# Patient Record
Sex: FEMALE | Race: WHITE | Employment: UNEMPLOYED | URBAN - METROPOLITAN AREA
[De-identification: names, ages, dates, MRNs, and addresses within clinical notes are randomized per-mention and may not be internally consistent; named-entity substitution may affect disease eponyms.]

---

## 2017-02-22 RX ORDER — LEVOCETIRIZINE DIHYDROCHLORIDE 5 MG/1
TABLET, FILM COATED ORAL
Qty: 30 TABLET | Refills: 0 | Status: SHIPPED | OUTPATIENT
Start: 2017-02-22 | End: 2017-02-22

## 2017-02-22 RX ORDER — LEVOCETIRIZINE DIHYDROCHLORIDE 5 MG/1
TABLET, FILM COATED ORAL
Qty: 45 TABLET | Refills: 0 | Status: SHIPPED | OUTPATIENT
Start: 2017-02-22 | End: 2017-07-03

## 2017-03-17 ENCOUNTER — OFFICE VISIT (OUTPATIENT)
Dept: INTERNAL MEDICINE CLINIC | Facility: CLINIC | Age: 57
End: 2017-03-17

## 2017-03-17 VITALS
BODY MASS INDEX: 21.67 KG/M2 | HEART RATE: 114 BPM | RESPIRATION RATE: 17 BRPM | DIASTOLIC BLOOD PRESSURE: 68 MMHG | OXYGEN SATURATION: 98 % | TEMPERATURE: 99 F | SYSTOLIC BLOOD PRESSURE: 124 MMHG | HEIGHT: 60.25 IN | WEIGHT: 111.81 LBS

## 2017-03-17 DIAGNOSIS — K58.1 IRRITABLE BOWEL SYNDROME WITH CONSTIPATION: Primary | ICD-10-CM

## 2017-03-17 PROCEDURE — 99213 OFFICE O/P EST LOW 20 MIN: CPT | Performed by: INTERNAL MEDICINE

## 2017-03-17 RX ORDER — OMEPRAZOLE 20 MG/1
20 CAPSULE, DELAYED RELEASE ORAL
Qty: 30 CAPSULE | Refills: 6 | Status: SHIPPED | OUTPATIENT
Start: 2017-03-17 | End: 2017-12-05 | Stop reason: ALTCHOICE

## 2017-03-17 NOTE — PROGRESS NOTES
HPI:    Patient ID: Mikey Lang is a 61year old female. HPIpt here for evaluation of bloating and constipation and irritation of the bowel tract.     Review of Systems         Current Outpatient Prescriptions:  LEVOCETIRIZINE DIHYDROCHLORIDE 5 MG

## 2017-06-05 ENCOUNTER — TELEPHONE (OUTPATIENT)
Dept: INTERNAL MEDICINE CLINIC | Facility: CLINIC | Age: 57
End: 2017-06-05

## 2017-06-05 DIAGNOSIS — Z12.39 BREAST CANCER SCREENING: Primary | ICD-10-CM

## 2017-06-13 ENCOUNTER — TELEPHONE (OUTPATIENT)
Dept: INTERNAL MEDICINE CLINIC | Facility: CLINIC | Age: 57
End: 2017-06-13

## 2017-06-13 NOTE — TELEPHONE ENCOUNTER
Changed order at patient request to screening mammogram   Left patient voicemail that the oder has been chaNGED

## 2017-06-13 NOTE — TELEPHONE ENCOUNTER
Pt stated that her mamogram has diagnnois on it an it should read, as a regular mamogram.  Pt wants this to be corrected, because she should have a normal mamogram.

## 2017-07-03 RX ORDER — LEVOCETIRIZINE DIHYDROCHLORIDE 5 MG/1
TABLET, FILM COATED ORAL
Qty: 45 TABLET | Refills: 0 | Status: SHIPPED | OUTPATIENT
Start: 2017-07-03 | End: 2017-11-15

## 2017-07-03 RX ORDER — SIMVASTATIN 20 MG
TABLET ORAL
Qty: 30 TABLET | Refills: 0 | OUTPATIENT
Start: 2017-07-03

## 2017-08-07 ENCOUNTER — NURSE ONLY (OUTPATIENT)
Dept: INTERNAL MEDICINE CLINIC | Facility: CLINIC | Age: 57
End: 2017-08-07

## 2017-08-07 DIAGNOSIS — E78.2 MIXED HYPERLIPIDEMIA: ICD-10-CM

## 2017-08-07 DIAGNOSIS — R53.83 OTHER FATIGUE: ICD-10-CM

## 2017-08-07 LAB
ALBUMIN SERPL BCP-MCNC: 4.2 G/DL (ref 3.5–4.8)
ALBUMIN/GLOB SERPL: 1.8 {RATIO} (ref 1–2)
ALP SERPL-CCNC: 111 U/L (ref 32–100)
ALT SERPL-CCNC: 137 U/L (ref 14–54)
ANION GAP SERPL CALC-SCNC: 16 MMOL/L (ref 0–18)
AST SERPL-CCNC: 269 U/L (ref 15–41)
BASOPHILS # BLD: 0.1 K/UL (ref 0–0.2)
BASOPHILS NFR BLD: 1 %
BILIRUB SERPL-MCNC: 1.1 MG/DL (ref 0.3–1.2)
BUN SERPL-MCNC: 8 MG/DL (ref 8–20)
BUN/CREAT SERPL: 16 (ref 10–20)
CALCIUM SERPL-MCNC: 9.3 MG/DL (ref 8.5–10.5)
CHLORIDE SERPL-SCNC: 98 MMOL/L (ref 95–110)
CHOLEST SERPL-MCNC: 213 MG/DL (ref 110–200)
CO2 SERPL-SCNC: 21 MMOL/L (ref 22–32)
CREAT SERPL-MCNC: 0.5 MG/DL (ref 0.5–1.5)
EOSINOPHIL # BLD: 0 K/UL (ref 0–0.7)
EOSINOPHIL NFR BLD: 1 %
ERYTHROCYTE [DISTWIDTH] IN BLOOD BY AUTOMATED COUNT: 15.9 % (ref 11–15)
GLOBULIN PLAS-MCNC: 2.4 G/DL (ref 2.5–3.7)
GLUCOSE SERPL-MCNC: 73 MG/DL (ref 70–99)
HCT VFR BLD AUTO: 38.6 % (ref 35–48)
HDLC SERPL-MCNC: 110 MG/DL
HGB BLD-MCNC: 13.1 G/DL (ref 12–16)
LDLC SERPL CALC-MCNC: 90 MG/DL (ref 0–99)
LYMPHOCYTES # BLD: 1.1 K/UL (ref 1–4)
LYMPHOCYTES NFR BLD: 23 %
MCH RBC QN AUTO: 34.8 PG (ref 27–32)
MCHC RBC AUTO-ENTMCNC: 34 G/DL (ref 32–37)
MCV RBC AUTO: 102.5 FL (ref 80–100)
MONOCYTES # BLD: 0.4 K/UL (ref 0–1)
MONOCYTES NFR BLD: 9 %
NEUTROPHILS # BLD AUTO: 3.3 K/UL (ref 1.8–7.7)
NEUTROPHILS NFR BLD: 67 %
NONHDLC SERPL-MCNC: 103 MG/DL
OSMOLALITY UR CALC.SUM OF ELEC: 277 MOSM/KG (ref 275–295)
PLATELET # BLD AUTO: 198 K/UL (ref 140–400)
PMV BLD AUTO: 9.1 FL (ref 7.4–10.3)
POTASSIUM SERPL-SCNC: 3.5 MMOL/L (ref 3.3–5.1)
PROT SERPL-MCNC: 6.6 G/DL (ref 5.9–8.4)
RBC # BLD AUTO: 3.77 M/UL (ref 3.7–5.4)
SODIUM SERPL-SCNC: 135 MMOL/L (ref 136–144)
TRIGL SERPL-MCNC: 63 MG/DL (ref 1–149)
TSH SERPL-ACNC: 1.45 UIU/ML (ref 0.45–5.33)
WBC # BLD AUTO: 4.8 K/UL (ref 4–11)

## 2017-08-07 PROCEDURE — 80061 LIPID PANEL: CPT | Performed by: INTERNAL MEDICINE

## 2017-08-07 PROCEDURE — 36415 COLL VENOUS BLD VENIPUNCTURE: CPT | Performed by: INTERNAL MEDICINE

## 2017-08-07 PROCEDURE — 80050 GENERAL HEALTH PANEL: CPT | Performed by: INTERNAL MEDICINE

## 2017-08-07 NOTE — PROGRESS NOTES
Pt's  verified  Pt presented for a fasting blood draw. Per Dr. Audie Dakin ordered LIPID TSHR CBC CMP. Pt tolerated venipuncture well,specimens drawn from Rt  arm  and sent out to [address] [place] [address] lab for testing.

## 2017-08-08 ENCOUNTER — TELEPHONE (OUTPATIENT)
Dept: INTERNAL MEDICINE CLINIC | Facility: CLINIC | Age: 57
End: 2017-08-08

## 2017-08-08 ENCOUNTER — MED REC SCAN ONLY (OUTPATIENT)
Dept: INTERNAL MEDICINE CLINIC | Facility: CLINIC | Age: 57
End: 2017-08-08

## 2017-08-08 RX ORDER — LORAZEPAM 0.5 MG/1
0.5 TABLET ORAL EVERY 6 HOURS PRN
Qty: 30 TABLET | Refills: 1 | Status: SHIPPED | OUTPATIENT
Start: 2017-08-08 | End: 2017-12-05 | Stop reason: ALTCHOICE

## 2017-08-08 NOTE — TELEPHONE ENCOUNTER
Pt's  verified  Called Pt re: Lorazepam being sent to her pharmacy by Dr. Leonarda Franz can take Q 6 HPRN.

## 2017-08-15 ENCOUNTER — HOSPITAL ENCOUNTER (OUTPATIENT)
Dept: MAMMOGRAPHY | Age: 57
Discharge: HOME | End: 2017-08-15
Attending: INTERNAL MEDICINE
Payer: COMMERCIAL

## 2017-08-15 DIAGNOSIS — Z12.39 BREAST CANCER SCREENING: ICD-10-CM

## 2017-08-15 PROCEDURE — 77067 SCR MAMMO BI INCL CAD: CPT | Performed by: INTERNAL MEDICINE

## 2017-09-05 RX ORDER — ESTRADIOL 0.05 MG/D
FILM, EXTENDED RELEASE TRANSDERMAL
Qty: 8 PATCH | Refills: 0 | Status: SHIPPED | OUTPATIENT
Start: 2017-09-05 | End: 2017-10-17

## 2017-10-05 ENCOUNTER — TELEPHONE (OUTPATIENT)
Dept: INTERNAL MEDICINE CLINIC | Facility: CLINIC | Age: 57
End: 2017-10-05

## 2017-10-05 NOTE — TELEPHONE ENCOUNTER
Patient is struggling with health issues, want to speak to one of Dr. Bunny akbar. Has liver and kidney disease.

## 2017-10-05 NOTE — TELEPHONE ENCOUNTER
Pt left message with answering service regarding needing to go into rehab now.  left message at 08:04am this morning

## 2017-10-06 NOTE — TELEPHONE ENCOUNTER
Called pt. Per patient she was seen at [facility] and was seen states she is now at home is feeling better is still in some pain but she never left the ER had test done and they allowed her to go home on her own will.

## 2017-10-06 NOTE — TELEPHONE ENCOUNTER
Patient advised to go to [facility] Emergency department as she needs there evaluation to enter [facility] for rehab

## 2017-10-09 ENCOUNTER — TELEPHONE (OUTPATIENT)
Dept: INTERNAL MEDICINE CLINIC | Facility: CLINIC | Age: 57
End: 2017-10-09

## 2017-10-12 NOTE — TELEPHONE ENCOUNTER
Pt's  verified  Called Pt back and informed that due to previous abn mammo and needing to repeat every 6 mos  She was entered in a reminder system- recent was normal per Dr. Wood Ford but she can follow recommendation for 6 mos screenings if indicated- Pt

## 2017-10-17 RX ORDER — ESTRADIOL 0.05 MG/D
FILM, EXTENDED RELEASE TRANSDERMAL
Qty: 8 PATCH | Refills: 0 | Status: SHIPPED | OUTPATIENT
Start: 2017-10-17 | End: 2017-12-05 | Stop reason: ALTCHOICE

## 2017-11-15 RX ORDER — METOPROLOL SUCCINATE 25 MG/1
TABLET, EXTENDED RELEASE ORAL
Qty: 60 TABLET | Refills: 0 | Status: SHIPPED | OUTPATIENT
Start: 2017-11-15 | End: 2017-11-15

## 2017-11-15 RX ORDER — LEVOCETIRIZINE DIHYDROCHLORIDE 5 MG/1
TABLET, FILM COATED ORAL
Qty: 45 TABLET | Refills: 0 | Status: SHIPPED | OUTPATIENT
Start: 2017-11-15 | End: 2018-04-23

## 2017-11-15 RX ORDER — LEVOCETIRIZINE DIHYDROCHLORIDE 5 MG/1
TABLET, FILM COATED ORAL
Qty: 45 TABLET | Refills: 0 | Status: SHIPPED | OUTPATIENT
Start: 2017-11-15 | End: 2017-11-15

## 2017-11-15 RX ORDER — METOPROLOL SUCCINATE 25 MG/1
TABLET, EXTENDED RELEASE ORAL
Qty: 60 TABLET | Refills: 0 | Status: SHIPPED | OUTPATIENT
Start: 2017-11-15 | End: 2017-12-05

## 2017-11-15 NOTE — TELEPHONE ENCOUNTER
Pt called needs a refill on metoprolol succinate, progesterone, levocetirizine. If we can please send the refill to [facility].

## 2017-12-05 ENCOUNTER — OFFICE VISIT (OUTPATIENT)
Dept: INTERNAL MEDICINE CLINIC | Facility: CLINIC | Age: 57
End: 2017-12-05

## 2017-12-05 ENCOUNTER — TELEPHONE (OUTPATIENT)
Dept: INTERNAL MEDICINE CLINIC | Facility: CLINIC | Age: 57
End: 2017-12-05

## 2017-12-05 VITALS
RESPIRATION RATE: 17 BRPM | HEIGHT: 60.25 IN | SYSTOLIC BLOOD PRESSURE: 136 MMHG | HEART RATE: 77 BPM | DIASTOLIC BLOOD PRESSURE: 88 MMHG | OXYGEN SATURATION: 98 % | BODY MASS INDEX: 19.38 KG/M2 | WEIGHT: 100 LBS

## 2017-12-05 DIAGNOSIS — Z78.0 POST-MENOPAUSAL: ICD-10-CM

## 2017-12-05 DIAGNOSIS — Z11.59 NEED FOR HEPATITIS C SCREENING TEST: ICD-10-CM

## 2017-12-05 DIAGNOSIS — I10 ESSENTIAL HYPERTENSION: ICD-10-CM

## 2017-12-05 DIAGNOSIS — R74.8 ELEVATED LIVER ENZYMES: Primary | ICD-10-CM

## 2017-12-05 DIAGNOSIS — F51.01 PRIMARY INSOMNIA: ICD-10-CM

## 2017-12-05 DIAGNOSIS — E78.2 MIXED HYPERLIPIDEMIA: ICD-10-CM

## 2017-12-05 PROCEDURE — 99213 OFFICE O/P EST LOW 20 MIN: CPT | Performed by: FAMILY MEDICINE

## 2017-12-05 PROCEDURE — 80076 HEPATIC FUNCTION PANEL: CPT | Performed by: FAMILY MEDICINE

## 2017-12-05 PROCEDURE — 80061 LIPID PANEL: CPT | Performed by: FAMILY MEDICINE

## 2017-12-05 PROCEDURE — 86803 HEPATITIS C AB TEST: CPT | Performed by: FAMILY MEDICINE

## 2017-12-05 RX ORDER — METOPROLOL SUCCINATE 25 MG/1
25 TABLET, EXTENDED RELEASE ORAL 2 TIMES DAILY
Qty: 180 TABLET | Refills: 3 | Status: SHIPPED | OUTPATIENT
Start: 2017-12-05 | End: 2017-12-05 | Stop reason: CLARIF

## 2017-12-05 RX ORDER — ALENDRONATE SODIUM 70 MG/1
70 TABLET ORAL WEEKLY
Qty: 12 TABLET | Refills: 3 | Status: SHIPPED | OUTPATIENT
Start: 2017-12-05 | End: 2018-04-23

## 2017-12-05 RX ORDER — ZOLPIDEM TARTRATE 10 MG/1
10 TABLET ORAL NIGHTLY PRN
Qty: 90 TABLET | Refills: 1 | Status: SHIPPED | OUTPATIENT
Start: 2017-12-05 | End: 2018-11-30

## 2017-12-05 RX ORDER — METOPROLOL SUCCINATE 25 MG/1
25 TABLET, EXTENDED RELEASE ORAL DAILY
Qty: 90 TABLET | Refills: 3 | Status: SHIPPED | OUTPATIENT
Start: 2017-12-05 | End: 2018-04-23

## 2017-12-05 RX ORDER — TRAZODONE HYDROCHLORIDE 50 MG/1
50 TABLET ORAL NIGHTLY
Qty: 90 TABLET | Refills: 1 | Status: SHIPPED | OUTPATIENT
Start: 2017-12-05 | End: 2018-12-11

## 2017-12-06 PROBLEM — F51.01 PRIMARY INSOMNIA: Status: ACTIVE | Noted: 2017-12-06

## 2017-12-06 PROBLEM — I10 ESSENTIAL HYPERTENSION: Status: ACTIVE | Noted: 2017-12-06

## 2017-12-06 PROBLEM — Z78.0 POST-MENOPAUSAL: Status: ACTIVE | Noted: 2017-12-06

## 2017-12-06 NOTE — PROGRESS NOTES
CC:  Follow - Up (Pt presents to clinic for routine f.up and fasting labs. Denies flu vaccine.)      Hx of CC:  FOLLOWING UP ON FORMER LFT ELEVATION--PATIENT ADMITS TO A PERIOD OF HEAVY ETOH-->IS NOW ABSTAINING. HAD STATIN STOPPED DUE TO LFT ELEVATION.   H tablet; Refill: 3  - Cholecalciferol (VITAMIN D) 1000 units Oral Tab; Take 1 tablet by mouth daily. Dispense: 360 tablet; Refill: 0    6. Need for hepatitis C screening test    - HCV ANTIBODY;  Future  - HCV ANTIBODY    None    Orders Placed This Encounter

## 2017-12-13 ENCOUNTER — TELEPHONE (OUTPATIENT)
Dept: INTERNAL MEDICINE CLINIC | Facility: CLINIC | Age: 57
End: 2017-12-13

## 2017-12-13 NOTE — TELEPHONE ENCOUNTER
Patient labs were drawn, she wants to make sure all the appropriate labs were done. She also would like to go over the liver results in detail because in the past her numbers have been over 250.  At the direction of the RN, advised the patient values can ch

## 2017-12-27 ENCOUNTER — TELEPHONE (OUTPATIENT)
Dept: INTERNAL MEDICINE CLINIC | Facility: CLINIC | Age: 57
End: 2017-12-27

## 2018-02-28 ENCOUNTER — TELEPHONE (OUTPATIENT)
Dept: INTERNAL MEDICINE CLINIC | Facility: CLINIC | Age: 58
End: 2018-02-28

## 2018-02-28 NOTE — TELEPHONE ENCOUNTER
She refused to see any other physician in the practice for a sooner visit.   She does not remember her dermatologist as it was 15 years ago   She is a [employer] P[employer] so she could go directly to dermatology please advise

## 2018-04-23 ENCOUNTER — OFFICE VISIT (OUTPATIENT)
Dept: INTERNAL MEDICINE CLINIC | Facility: CLINIC | Age: 58
End: 2018-04-23

## 2018-04-23 VITALS
HEART RATE: 81 BPM | DIASTOLIC BLOOD PRESSURE: 78 MMHG | WEIGHT: 103 LBS | HEIGHT: 60.25 IN | RESPIRATION RATE: 17 BRPM | OXYGEN SATURATION: 96 % | BODY MASS INDEX: 19.96 KG/M2 | TEMPERATURE: 98 F | SYSTOLIC BLOOD PRESSURE: 136 MMHG

## 2018-04-23 DIAGNOSIS — E55.9 VITAMIN D DEFICIENCY: ICD-10-CM

## 2018-04-23 DIAGNOSIS — I10 ESSENTIAL HYPERTENSION: ICD-10-CM

## 2018-04-23 DIAGNOSIS — Z00.00 WELLNESS EXAMINATION: Primary | ICD-10-CM

## 2018-04-23 DIAGNOSIS — Z78.0 POST-MENOPAUSAL: ICD-10-CM

## 2018-04-23 DIAGNOSIS — R53.83 OTHER FATIGUE: ICD-10-CM

## 2018-04-23 PROCEDURE — 99396 PREV VISIT EST AGE 40-64: CPT | Performed by: INTERNAL MEDICINE

## 2018-04-23 PROCEDURE — 88175 CYTOPATH C/V AUTO FLUID REDO: CPT | Performed by: INTERNAL MEDICINE

## 2018-04-23 RX ORDER — ALENDRONATE SODIUM 70 MG/1
70 TABLET ORAL WEEKLY
Qty: 12 TABLET | Refills: 3 | Status: SHIPPED | OUTPATIENT
Start: 2018-04-23 | End: 2018-12-11

## 2018-04-23 RX ORDER — FLUTICASONE PROPIONATE 50 MCG
2 SPRAY, SUSPENSION (ML) NASAL DAILY
Qty: 1 BOTTLE | Refills: 3 | Status: SHIPPED | OUTPATIENT
Start: 2018-04-23 | End: 2018-12-11

## 2018-04-23 RX ORDER — METOPROLOL SUCCINATE 25 MG/1
25 TABLET, EXTENDED RELEASE ORAL DAILY
Qty: 90 TABLET | Refills: 3 | Status: SHIPPED | OUTPATIENT
Start: 2018-04-23 | End: 2018-12-17

## 2018-04-23 RX ORDER — LEVOCETIRIZINE DIHYDROCHLORIDE 5 MG/1
TABLET, FILM COATED ORAL
Qty: 45 TABLET | Refills: 11 | Status: SHIPPED | OUTPATIENT
Start: 2018-04-23 | End: 2018-12-11

## 2018-04-23 NOTE — PROGRESS NOTES
HPI:    Patient ID: Lawrence Phelan is a 61year old female. Pt here for a general check up and to get PAp smear  And to evaluate a sore throat  And a mole on the breast.  Is having some ongoing issues with constipation and sleep disturbance.     Ariana Disp: 90 tablet Rfl: 1     Allergies:  Dog Dander [Dander]        PHYSICAL EXAM:   Physical Exam    Constitutional: She is oriented to person, place, and time and thin. HENT:   Head: Normocephalic.    Right Ear: No cerumen present  Left Ear: No cerumen pr Referrals:  [facility] SCREENING BILAT (LSJ=[id number])               Celeste Ayala MD  4/23/2018

## 2018-12-03 ENCOUNTER — TELEPHONE (OUTPATIENT)
Dept: INTERNAL MEDICINE CLINIC | Facility: CLINIC | Age: 58
End: 2018-12-03

## 2018-12-03 NOTE — TELEPHONE ENCOUNTER
Pt says she needs a referral for an orthopedic surgeon. Pt states she's in a lot of pain, please advise.

## 2018-12-03 NOTE — TELEPHONE ENCOUNTER
Fractured ankle few weeks ago, finally saw [facility] this weekend. Needs referral to orthopedic. Please call.

## 2018-12-04 NOTE — TELEPHONE ENCOUNTER
Called patient back re: Dr. Jose Norman. Patient informed me that her insurance is now [employer] and that Dr. Jose Norman is not in the plan. Educated patient that she needs to contact her insurance and inquire which orthopaedics are in the plan.

## 2018-12-10 ENCOUNTER — TELEPHONE (OUTPATIENT)
Dept: INTERNAL MEDICINE CLINIC | Facility: CLINIC | Age: 58
End: 2018-12-10

## 2018-12-10 NOTE — TELEPHONE ENCOUNTER
Pt is having surgery on Friday and needs blood wk ASAP, and the results needs to be sent to Dr Sukhdeep Mackey before the surgery.

## 2018-12-12 ENCOUNTER — TELEPHONE (OUTPATIENT)
Dept: INTERNAL MEDICINE CLINIC | Facility: CLINIC | Age: 58
End: 2018-12-12

## 2018-12-12 NOTE — TELEPHONE ENCOUNTER
Message via fax was received from patient, she is having surgery Friday and need pre-op testing, wants to know if she can stop by and see a nurse for labs and b/p check, please call her back.

## 2018-12-17 ENCOUNTER — OFFICE VISIT (OUTPATIENT)
Dept: INTERNAL MEDICINE CLINIC | Facility: CLINIC | Age: 58
End: 2018-12-17
Payer: COMMERCIAL

## 2018-12-17 VITALS
BODY MASS INDEX: 20.35 KG/M2 | HEART RATE: 89 BPM | TEMPERATURE: 99 F | WEIGHT: 105 LBS | HEIGHT: 60.25 IN | OXYGEN SATURATION: 99 %

## 2018-12-17 DIAGNOSIS — S82.61XA CLOSED DISPLACED FRACTURE OF LATERAL MALLEOLUS OF RIGHT FIBULA, INITIAL ENCOUNTER: ICD-10-CM

## 2018-12-17 DIAGNOSIS — Z01.818 PREOPERATIVE CLEARANCE: Primary | ICD-10-CM

## 2018-12-17 DIAGNOSIS — I10 ESSENTIAL HYPERTENSION: ICD-10-CM

## 2018-12-17 PROCEDURE — 85025 COMPLETE CBC W/AUTO DIFF WBC: CPT | Performed by: INTERNAL MEDICINE

## 2018-12-17 PROCEDURE — 80053 COMPREHEN METABOLIC PANEL: CPT | Performed by: INTERNAL MEDICINE

## 2018-12-17 PROCEDURE — 99214 OFFICE O/P EST MOD 30 MIN: CPT | Performed by: INTERNAL MEDICINE

## 2018-12-17 RX ORDER — HYDROCODONE BITARTRATE AND ACETAMINOPHEN 5; 325 MG/1; MG/1
TABLET ORAL
COMMUNITY
Start: 2018-12-01 | End: 2019-07-12

## 2018-12-17 RX ORDER — METOPROLOL SUCCINATE 25 MG/1
25 TABLET, EXTENDED RELEASE ORAL DAILY
Qty: 90 TABLET | Refills: 3 | Status: SHIPPED | OUTPATIENT
Start: 2018-12-17 | End: 2020-01-27

## 2018-12-17 NOTE — PROGRESS NOTES
HPI:    Patient ID: Bisi Dumont is a 60year old female. Pt here for preoperastive clearance for ORIF of right malleolus fracture which happened on a trip on stair ccase. \      Past hx htn -controlled with Metoprolol  HX of depression currently of equal, round, and reactive to light. No scleral icterus. Neck: Normal range of motion. Cardiovascular: Normal rate, regular rhythm and normal heart sounds. Pulmonary/Chest: Effort normal and breath sounds normal.   Abdominal: Soft.  She exhibits no d

## 2018-12-18 ENCOUNTER — ANESTHESIA (OUTPATIENT)
Dept: SURGERY | Facility: HOSPITAL | Age: 58
End: 2018-12-18
Payer: COMMERCIAL

## 2018-12-18 ENCOUNTER — ANESTHESIA EVENT (OUTPATIENT)
Dept: SURGERY | Facility: HOSPITAL | Age: 58
End: 2018-12-18
Payer: COMMERCIAL

## 2018-12-18 ENCOUNTER — APPOINTMENT (OUTPATIENT)
Dept: GENERAL RADIOLOGY | Facility: HOSPITAL | Age: 58
End: 2018-12-18
Attending: ORTHOPAEDIC SURGERY
Payer: COMMERCIAL

## 2018-12-18 ENCOUNTER — HOSPITAL ENCOUNTER (OUTPATIENT)
Facility: HOSPITAL | Age: 58
Setting detail: HOSPITAL OUTPATIENT SURGERY
Discharge: HOME | End: 2018-12-18
Attending: ORTHOPAEDIC SURGERY | Admitting: ORTHOPAEDIC SURGERY
Payer: COMMERCIAL

## 2018-12-18 VITALS
OXYGEN SATURATION: 92 % | BODY MASS INDEX: 18.65 KG/M2 | DIASTOLIC BLOOD PRESSURE: 88 MMHG | HEIGHT: 60 IN | TEMPERATURE: 98 F | RESPIRATION RATE: 17 BRPM | SYSTOLIC BLOOD PRESSURE: 139 MMHG | HEART RATE: 67 BPM | WEIGHT: 95 LBS

## 2018-12-18 PROCEDURE — 0QSJ04Z REPOSITION RIGHT FIBULA WITH INTERNAL FIXATION DEVICE, OPEN APPROACH: ICD-10-PCS | Performed by: ORTHOPAEDIC SURGERY

## 2018-12-18 PROCEDURE — 0SSF04Z REPOSITION RIGHT ANKLE JOINT WITH INTERNAL FIXATION DEVICE, OPEN APPROACH: ICD-10-PCS | Performed by: ORTHOPAEDIC SURGERY

## 2018-12-18 PROCEDURE — 76001 XR C-ARM FLUORO >1 HOUR  (CPT=76001): CPT | Performed by: ORTHOPAEDIC SURGERY

## 2018-12-18 PROCEDURE — 0LQV0ZZ REPAIR RIGHT FOOT TENDON, OPEN APPROACH: ICD-10-PCS | Performed by: ORTHOPAEDIC SURGERY

## 2018-12-18 DEVICE — IMPLANTABLE DEVICE: Type: IMPLANTABLE DEVICE | Site: ANKLE | Status: FUNCTIONAL

## 2018-12-18 DEVICE — SCREW CORT 3.5X16 2348-16-35: Type: IMPLANTABLE DEVICE | Site: ANKLE | Status: FUNCTIONAL

## 2018-12-18 DEVICE — SCREW BN 2.7MM 16MM SS ELB: Type: IMPLANTABLE DEVICE | Site: ANKLE | Status: FUNCTIONAL

## 2018-12-18 DEVICE — SCREW BN 2.7MM 14MM SS ELB: Type: IMPLANTABLE DEVICE | Site: ANKLE | Status: FUNCTIONAL

## 2018-12-18 RX ORDER — FAMOTIDINE 20 MG/1
20 TABLET ORAL ONCE
Status: DISCONTINUED | OUTPATIENT
Start: 2018-12-18 | End: 2018-12-18 | Stop reason: HOSPADM

## 2018-12-18 RX ORDER — METOCLOPRAMIDE 10 MG/1
10 TABLET ORAL ONCE
Status: DISCONTINUED | OUTPATIENT
Start: 2018-12-18 | End: 2018-12-18 | Stop reason: HOSPADM

## 2018-12-18 RX ORDER — ACETAMINOPHEN 325 MG/1
650 TABLET ORAL EVERY 4 HOURS PRN
Status: DISCONTINUED | OUTPATIENT
Start: 2018-12-18 | End: 2018-12-18

## 2018-12-18 RX ORDER — HYDROCODONE BITARTRATE AND ACETAMINOPHEN 5; 325 MG/1; MG/1
1 TABLET ORAL AS NEEDED
Status: DISCONTINUED | OUTPATIENT
Start: 2018-12-18 | End: 2018-12-18

## 2018-12-18 RX ORDER — METOPROLOL TARTRATE 5 MG/5ML
2.5 INJECTION INTRAVENOUS ONCE
Status: DISCONTINUED | OUTPATIENT
Start: 2018-12-18 | End: 2018-12-18

## 2018-12-18 RX ORDER — MORPHINE SULFATE 4 MG/ML
4 INJECTION, SOLUTION INTRAMUSCULAR; INTRAVENOUS EVERY 2 HOUR PRN
Status: DISCONTINUED | OUTPATIENT
Start: 2018-12-18 | End: 2018-12-18

## 2018-12-18 RX ORDER — MORPHINE SULFATE 4 MG/ML
2 INJECTION, SOLUTION INTRAMUSCULAR; INTRAVENOUS EVERY 10 MIN PRN
Status: DISCONTINUED | OUTPATIENT
Start: 2018-12-18 | End: 2018-12-18

## 2018-12-18 RX ORDER — CEFAZOLIN SODIUM/WATER 2 G/20 ML
2 SYRINGE (ML) INTRAVENOUS ONCE
Status: COMPLETED | OUTPATIENT
Start: 2018-12-18 | End: 2018-12-18

## 2018-12-18 RX ORDER — NALOXONE HYDROCHLORIDE 0.4 MG/ML
80 INJECTION, SOLUTION INTRAMUSCULAR; INTRAVENOUS; SUBCUTANEOUS AS NEEDED
Status: DISCONTINUED | OUTPATIENT
Start: 2018-12-18 | End: 2018-12-18

## 2018-12-18 RX ORDER — FLUTICASONE PROPIONATE 50 MCG
1 SPRAY, SUSPENSION (ML) NASAL DAILY
COMMUNITY
End: 2019-07-12

## 2018-12-18 RX ORDER — MIDAZOLAM HYDROCHLORIDE 1 MG/ML
INJECTION INTRAMUSCULAR; INTRAVENOUS AS NEEDED
Status: DISCONTINUED | OUTPATIENT
Start: 2018-12-18 | End: 2018-12-18 | Stop reason: SURG

## 2018-12-18 RX ORDER — ACETAMINOPHEN 500 MG
1000 TABLET ORAL ONCE
Status: COMPLETED | OUTPATIENT
Start: 2018-12-18 | End: 2018-12-18

## 2018-12-18 RX ORDER — SODIUM CHLORIDE, SODIUM LACTATE, POTASSIUM CHLORIDE, CALCIUM CHLORIDE 600; 310; 30; 20 MG/100ML; MG/100ML; MG/100ML; MG/100ML
INJECTION, SOLUTION INTRAVENOUS CONTINUOUS
Status: DISCONTINUED | OUTPATIENT
Start: 2018-12-18 | End: 2018-12-18

## 2018-12-18 RX ORDER — MORPHINE SULFATE 4 MG/ML
6 INJECTION, SOLUTION INTRAMUSCULAR; INTRAVENOUS EVERY 2 HOUR PRN
Status: DISCONTINUED | OUTPATIENT
Start: 2018-12-18 | End: 2018-12-18

## 2018-12-18 RX ORDER — ONDANSETRON 2 MG/ML
4 INJECTION INTRAMUSCULAR; INTRAVENOUS ONCE AS NEEDED
Status: DISCONTINUED | OUTPATIENT
Start: 2018-12-18 | End: 2018-12-18

## 2018-12-18 RX ORDER — EPHEDRINE SULFATE 50 MG/ML
INJECTION, SOLUTION INTRAVENOUS AS NEEDED
Status: DISCONTINUED | OUTPATIENT
Start: 2018-12-18 | End: 2018-12-18 | Stop reason: SURG

## 2018-12-18 RX ORDER — ONDANSETRON 2 MG/ML
4 INJECTION INTRAMUSCULAR; INTRAVENOUS EVERY 6 HOURS PRN
Status: DISCONTINUED | OUTPATIENT
Start: 2018-12-18 | End: 2018-12-18

## 2018-12-18 RX ORDER — HYDROCODONE BITARTRATE AND ACETAMINOPHEN 10; 325 MG/1; MG/1
1 TABLET ORAL EVERY 4 HOURS PRN
Status: DISCONTINUED | OUTPATIENT
Start: 2018-12-18 | End: 2018-12-18

## 2018-12-18 RX ORDER — MORPHINE SULFATE 10 MG/ML
6 INJECTION, SOLUTION INTRAMUSCULAR; INTRAVENOUS EVERY 10 MIN PRN
Status: DISCONTINUED | OUTPATIENT
Start: 2018-12-18 | End: 2018-12-18

## 2018-12-18 RX ORDER — BUPIVACAINE HYDROCHLORIDE 2.5 MG/ML
INJECTION, SOLUTION EPIDURAL; INFILTRATION; INTRACAUDAL AS NEEDED
Status: DISCONTINUED | OUTPATIENT
Start: 2018-12-18 | End: 2018-12-18 | Stop reason: HOSPADM

## 2018-12-18 RX ORDER — MORPHINE SULFATE 4 MG/ML
2 INJECTION, SOLUTION INTRAMUSCULAR; INTRAVENOUS EVERY 2 HOUR PRN
Status: DISCONTINUED | OUTPATIENT
Start: 2018-12-18 | End: 2018-12-18

## 2018-12-18 RX ORDER — HYDROCODONE BITARTRATE AND ACETAMINOPHEN 10; 325 MG/1; MG/1
2 TABLET ORAL EVERY 4 HOURS PRN
Status: DISCONTINUED | OUTPATIENT
Start: 2018-12-18 | End: 2018-12-18

## 2018-12-18 RX ORDER — HALOPERIDOL 5 MG/ML
0.25 INJECTION INTRAMUSCULAR ONCE AS NEEDED
Status: DISCONTINUED | OUTPATIENT
Start: 2018-12-18 | End: 2018-12-18

## 2018-12-18 RX ORDER — MORPHINE SULFATE 4 MG/ML
4 INJECTION, SOLUTION INTRAMUSCULAR; INTRAVENOUS EVERY 10 MIN PRN
Status: DISCONTINUED | OUTPATIENT
Start: 2018-12-18 | End: 2018-12-18

## 2018-12-18 RX ORDER — HYDROCODONE BITARTRATE AND ACETAMINOPHEN 5; 325 MG/1; MG/1
2 TABLET ORAL AS NEEDED
Status: DISCONTINUED | OUTPATIENT
Start: 2018-12-18 | End: 2018-12-18

## 2018-12-18 RX ADMIN — SODIUM CHLORIDE, SODIUM LACTATE, POTASSIUM CHLORIDE, CALCIUM CHLORIDE: 600; 310; 30; 20 INJECTION, SOLUTION INTRAVENOUS at 13:50:00

## 2018-12-18 RX ADMIN — SODIUM CHLORIDE, SODIUM LACTATE, POTASSIUM CHLORIDE, CALCIUM CHLORIDE: 600; 310; 30; 20 INJECTION, SOLUTION INTRAVENOUS at 14:45:00

## 2018-12-18 RX ADMIN — EPHEDRINE SULFATE 5 MG: 50 INJECTION, SOLUTION INTRAVENOUS at 13:49:00

## 2018-12-18 RX ADMIN — SODIUM CHLORIDE, SODIUM LACTATE, POTASSIUM CHLORIDE, CALCIUM CHLORIDE: 600; 310; 30; 20 INJECTION, SOLUTION INTRAVENOUS at 13:38:00

## 2018-12-18 RX ADMIN — CEFAZOLIN SODIUM/WATER 2 G: 2 G/20 ML SYRINGE (ML) INTRAVENOUS at 13:52:00

## 2018-12-18 RX ADMIN — MIDAZOLAM HYDROCHLORIDE 2 MG: 1 INJECTION INTRAMUSCULAR; INTRAVENOUS at 13:38:00

## 2018-12-18 NOTE — ANESTHESIA PREPROCEDURE EVALUATION
Anesthesia PreOp Note    HPI:     Jonah Cespedes is a 60year old female who presents for preoperative consultation requested by: Jocelyn Barrow MD    Date of Surgery: 12/18/2018    Procedure(s):  ANKLE OPEN REDUCTION INTERNAL FIXATION  Indication: Nichole Last Rate: 20 mL/hr at 12/18/18 1242   metoprolol Tartrate (LOPRESSOR) tab 25 mg 25 mg Oral Once PRN Sixto Love MD    famoTIDine (PEPCID) tab 20 mg 20 mg Oral Once Sixto Love MD    Metoclopramide HCl (REGLAN) tab 10 mg 10 mg Oral Once Julissa Velazquez Service: Not Asked        Blood Transfusions: Not Asked        Caffeine Concern: Yes          Coffee 3 cups daily        Occupational Exposure: Not Asked        Hobby Hazards: Not Asked        Sleep Concern: Not Asked        Stress Concern: Not Asked Abdominal  - normal exam             Anesthesia Plan:   ASA:  2  Plan:   General  Airway:  LMA  Post-op Pain Management: IV analgesics  Informed Consent Plan and Risks Discussed With:  Patient and spouse  Discussed plan with:  Surgeon      I have informed

## 2018-12-18 NOTE — H&P
R ankle pain s/p fall 11/11/18. PMH: HtN  Meds:  Metoprolol, T3  NKDA  Soc: +Tob  Fam: DM, Heart Dz, HLD    HEENT: NCAT  C-Spine: NT, FROM  Abd: S, NT  RLE:  No E/S/W. +Lat mal ttp/crep.   Neuro: NVID  Skin: intact    A: R Fernandez C lateral mal fx, possib

## 2018-12-18 NOTE — ANESTHESIA PROCEDURE NOTES
ANESTHESIA INTUBATION  Date/Time: 12/18/2018 1:50 PM  Urgency: elective      General Information and Staff    Patient location during procedure: OR  Anesthesiologist: Polina Whelan MD  Performed: anesthesiologist     Indications and Patient Condition

## 2018-12-18 NOTE — ANESTHESIA POSTPROCEDURE EVALUATION
Patient: Jerome Nickerson    Procedure Summary     Date:  12/18/18 Room / Location:  [address] [place] [address] MAIN OR 05 / [address] [place] [address] MAIN OR    Anesthesia Start:  4601 Anesthesia Stop:  1236    Procedure:  ANKLE OPEN REDUCTION INTERNAL FIXATION (Right Ankle) Diagnosis:  (Closed dis

## 2018-12-18 NOTE — BRIEF OP NOTE
Pre-Operative Diagnosis: Closed displaced fracture of lateral malleolus of right fibula, Fifth metatarsal fracture     Post-Operative Diagnosis: Closed displaced fracture of lateral malleolus of right fibula, Syndesmotic disruption, Deltoid ligament tear,

## 2018-12-19 NOTE — OPERATIVE REPORT
[facility]    PATIENT'S NAME: Jean-Claude Witt   ATTENDING PHYSICIAN: Berta Arteaga. Angela Ballard MD   OPERATING PHYSICIAN: Berta Arteaga.  Angela Ballard MD   PATIENT ACCOUNT#:   [de-identified]    LOCATION:  [facility] 10  MEDICAL RECORD #:   [id number]       DATE OF distally, then reduced the fracture to the plate, and then fixed the plate proximally and then distally again.   Then, I put a bone-holding clamp on the distal fibula, and with a laterally-directed load, under live fluoroscopy, there was found to be distal

## 2019-01-14 ENCOUNTER — TELEPHONE (OUTPATIENT)
Dept: INTERNAL MEDICINE CLINIC | Facility: CLINIC | Age: 59
End: 2019-01-14

## 2019-01-14 NOTE — TELEPHONE ENCOUNTER
Patient has been constipated due to inactivity and poor appetite s/p ankle fx. Finally forced a BM few days ago. Now she has fresh blood each time she has a BM, in the toilet and on the TP. She is unaware of any hemorrhoids and denies anal pain.  Only took

## 2019-05-01 DIAGNOSIS — I10 ESSENTIAL HYPERTENSION: ICD-10-CM

## 2019-05-01 RX ORDER — METOPROLOL SUCCINATE 25 MG/1
TABLET, EXTENDED RELEASE ORAL
Qty: 90 TABLET | Refills: 0 | Status: SHIPPED | OUTPATIENT
Start: 2019-05-01 | End: 2019-07-12

## 2019-05-04 ENCOUNTER — TELEPHONE (OUTPATIENT)
Dept: INTERNAL MEDICINE CLINIC | Facility: CLINIC | Age: 59
End: 2019-05-04

## 2019-05-28 ENCOUNTER — TELEPHONE (OUTPATIENT)
Dept: INTERNAL MEDICINE CLINIC | Facility: CLINIC | Age: 59
End: 2019-05-28

## 2019-05-28 NOTE — TELEPHONE ENCOUNTER
Spoke with pt and relayed that gait problems can appear after a protracted medical illness as she had during stay at [facility].  To give it patience and time.

## 2019-05-28 NOTE — TELEPHONE ENCOUNTER
PT called from [address] to cancel todays appointment. She wants Dr. Rylie Perez to know she was admitted and that she will call her when she gets out.

## 2019-06-03 ENCOUNTER — TELEPHONE (OUTPATIENT)
Dept: INTERNAL MEDICINE CLINIC | Facility: CLINIC | Age: 59
End: 2019-06-03

## 2019-06-11 ENCOUNTER — TELEPHONE (OUTPATIENT)
Dept: INTERNAL MEDICINE CLINIC | Facility: CLINIC | Age: 59
End: 2019-06-11

## 2019-06-11 NOTE — TELEPHONE ENCOUNTER
Patient has been admitted to home health. Informed her that she needs to contact [address] first while they are caring for her. If they needs anything from us, they will call.

## 2019-06-12 ENCOUNTER — LAB REQUISITION (OUTPATIENT)
Dept: LAB | Facility: HOSPITAL | Age: 59
End: 2019-06-12
Payer: COMMERCIAL

## 2019-06-12 DIAGNOSIS — N39.0 URINARY TRACT INFECTION: ICD-10-CM

## 2019-06-12 PROCEDURE — 87077 CULTURE AEROBIC IDENTIFY: CPT | Performed by: INTERNAL MEDICINE

## 2019-06-12 PROCEDURE — 87186 SC STD MICRODIL/AGAR DIL: CPT | Performed by: INTERNAL MEDICINE

## 2019-06-12 PROCEDURE — 87086 URINE CULTURE/COLONY COUNT: CPT | Performed by: INTERNAL MEDICINE

## 2019-06-12 PROCEDURE — 81001 URINALYSIS AUTO W/SCOPE: CPT | Performed by: INTERNAL MEDICINE

## 2019-06-13 ENCOUNTER — TELEPHONE (OUTPATIENT)
Dept: INTERNAL MEDICINE CLINIC | Facility: CLINIC | Age: 59
End: 2019-06-13

## 2019-07-12 ENCOUNTER — OFFICE VISIT (OUTPATIENT)
Dept: INTERNAL MEDICINE CLINIC | Facility: CLINIC | Age: 59
End: 2019-07-12
Payer: COMMERCIAL

## 2019-07-12 VITALS
SYSTOLIC BLOOD PRESSURE: 116 MMHG | BODY MASS INDEX: 18.02 KG/M2 | DIASTOLIC BLOOD PRESSURE: 92 MMHG | HEART RATE: 88 BPM | OXYGEN SATURATION: 96 % | WEIGHT: 93 LBS | HEIGHT: 60.25 IN

## 2019-07-12 DIAGNOSIS — M81.0 AGE-RELATED OSTEOPOROSIS WITHOUT CURRENT PATHOLOGICAL FRACTURE: ICD-10-CM

## 2019-07-12 DIAGNOSIS — I10 ESSENTIAL HYPERTENSION: Primary | ICD-10-CM

## 2019-07-12 DIAGNOSIS — Z12.31 SCREENING MAMMOGRAM, ENCOUNTER FOR: ICD-10-CM

## 2019-07-12 DIAGNOSIS — Z12.11 COLON CANCER SCREENING: ICD-10-CM

## 2019-07-12 DIAGNOSIS — I50.30 DIASTOLIC CONGESTIVE HEART FAILURE, UNSPECIFIED HF CHRONICITY (HCC): ICD-10-CM

## 2019-07-12 PROCEDURE — 99214 OFFICE O/P EST MOD 30 MIN: CPT | Performed by: INTERNAL MEDICINE

## 2019-07-12 NOTE — PROGRESS NOTES
HPI:    Patient ID: Mary Guzman is a 60year old female. Pt here for clinical fu after acomplicated hospital stay for sepsis pneumonia and heart failure. Had to be in subacute rehab to regain her strength. Needs a fu echo to assess LV function. failure, unspecified hf chronicity (hcc) check echo cardiogram  Age-related osteoporosis without current pathological fracture on Fosamax    No orders of the defined types were placed in this encounter.       Meds This Visit:  Requested Prescriptions      N

## 2019-07-13 LAB
ABSOLUTE BASOPHILS: 50 CELLS/UL (ref 0–200)
ABSOLUTE EOSINOPHILS: 40 CELLS/UL (ref 15–500)
ABSOLUTE LYMPHOCYTES: 3267 CELLS/UL (ref 850–3900)
ABSOLUTE MONOCYTES: 584 CELLS/UL (ref 200–950)
ABSOLUTE NEUTROPHILS: 5960 CELLS/UL (ref 1500–7800)
ALBUMIN/GLOBULIN RATIO: 1.2 (CALC) (ref 1–2.5)
ALBUMIN: 4.1 G/DL (ref 3.6–5.1)
ALKALINE PHOSPHATASE: 92 U/L (ref 33–130)
ALT: 5 U/L (ref 6–29)
AST: 15 U/L (ref 10–35)
BASOPHILS: 0.5 %
BILIRUBIN, TOTAL: 0.4 MG/DL (ref 0.2–1.2)
BUN: 8 MG/DL (ref 7–25)
CALCIUM: 10.4 MG/DL (ref 8.6–10.4)
CARBON DIOXIDE: 25 MMOL/L (ref 20–32)
CHLORIDE: 107 MMOL/L (ref 98–110)
CHOL/HDLC RATIO: 3.4 (CALC)
CHOLESTEROL, TOTAL: 212 MG/DL
CREATININE: 0.68 MG/DL (ref 0.5–1.05)
EGFR IF AFRICN AM: 111 ML/MIN/1.73M2
EGFR IF NONAFRICN AM: 96 ML/MIN/1.73M2
EOSINOPHILS: 0.4 %
GLOBULIN: 3.5 G/DL (CALC) (ref 1.9–3.7)
GLUCOSE: 78 MG/DL (ref 65–99)
HDL CHOLESTEROL: 62 MG/DL
HEMATOCRIT: 40 % (ref 35–45)
HEMOGLOBIN: 12.8 G/DL (ref 11.7–15.5)
LDL-CHOLESTEROL: 115 MG/DL (CALC)
LYMPHOCYTES: 33 %
MCH: 31 PG (ref 27–33)
MCHC: 32 G/DL (ref 32–36)
MCV: 96.9 FL (ref 80–100)
MONOCYTES: 5.9 %
MPV: 10.2 FL (ref 7.5–12.5)
NEUTROPHILS: 60.2 %
NON-HDL CHOLESTEROL: 150 MG/DL (CALC)
PLATELET COUNT: 399 THOUSAND/UL (ref 140–400)
POTASSIUM: 4.8 MMOL/L (ref 3.5–5.3)
PROTEIN, TOTAL: 7.6 G/DL (ref 6.1–8.1)
RDW: 13.2 % (ref 11–15)
RED BLOOD CELL COUNT: 4.13 MILLION/UL (ref 3.8–5.1)
SODIUM: 139 MMOL/L (ref 135–146)
TRIGLYCERIDES: 232 MG/DL
WHITE BLOOD CELL COUNT: 9.9 THOUSAND/UL (ref 3.8–10.8)

## 2019-07-15 ENCOUNTER — TELEPHONE (OUTPATIENT)
Dept: INTERNAL MEDICINE CLINIC | Facility: CLINIC | Age: 59
End: 2019-07-15

## 2019-07-15 NOTE — TELEPHONE ENCOUNTER
VOICEMAIL ON NURSE LINE    Pt called & wanted clarification on if she was supposed to have an ECHO done.

## 2019-08-08 ENCOUNTER — TELEPHONE (OUTPATIENT)
Dept: INTERNAL MEDICINE CLINIC | Facility: CLINIC | Age: 59
End: 2019-08-08

## 2019-08-08 NOTE — TELEPHONE ENCOUNTER
Char Wheeler from [facility] called, pt is being discharged as she is no longer home bound, has meet all goals.   Char Wheeler can be reached at [phone]    CV

## 2019-10-10 ENCOUNTER — TELEPHONE (OUTPATIENT)
Dept: INTERNAL MEDICINE CLINIC | Facility: CLINIC | Age: 59
End: 2019-10-10

## 2019-10-10 NOTE — TELEPHONE ENCOUNTER
Patient scheduled an office visit on Tuesday, 10/15 at 10 am.    She said if the doctor prefers her to drop off the forms she can, otherwise she'll bring them for her appointment visit.   She mentioned with the form that the doctor will need to ask her ques

## 2019-10-14 ENCOUNTER — TELEPHONE (OUTPATIENT)
Dept: INTERNAL MEDICINE CLINIC | Facility: CLINIC | Age: 59
End: 2019-10-14

## 2019-10-14 NOTE — TELEPHONE ENCOUNTER
Pt called and canceled her appointment for tomorrow 10/15/19. Pt states that she will mail the forms to Dr Sebastian Poole to fill out and \"if Dr Sebastian Poole wants to charge her for filling out the forms, she can do that\".  Dr Sebastian Poole states that she will fill out the f

## 2019-10-21 ENCOUNTER — TELEPHONE (OUTPATIENT)
Dept: INTERNAL MEDICINE CLINIC | Facility: CLINIC | Age: 59
End: 2019-10-21

## 2019-10-21 NOTE — TELEPHONE ENCOUNTER
Patient inquiring about something that will help her to quit smoking. If Dr. Tatum Ramos could please call her when she gets a chance.

## 2019-12-07 ENCOUNTER — HOSPITAL ENCOUNTER (OUTPATIENT)
Dept: MAMMOGRAPHY | Age: 59
Discharge: HOME | End: 2019-12-07
Attending: INTERNAL MEDICINE
Payer: COMMERCIAL

## 2019-12-07 DIAGNOSIS — Z12.31 SCREENING MAMMOGRAM, ENCOUNTER FOR: ICD-10-CM

## 2019-12-07 PROCEDURE — 77063 BREAST TOMOSYNTHESIS BI: CPT | Performed by: INTERNAL MEDICINE

## 2019-12-07 PROCEDURE — 77067 SCR MAMMO BI INCL CAD: CPT | Performed by: INTERNAL MEDICINE

## 2020-01-02 ENCOUNTER — TELEPHONE (OUTPATIENT)
Dept: INTERNAL MEDICINE CLINIC | Facility: CLINIC | Age: 60
End: 2020-01-02

## 2020-01-03 NOTE — TELEPHONE ENCOUNTER
Medical records release form was received from  [address] [place] [address] [phone]  Ph. [phone] and sent to [employer] [facility]

## 2020-01-26 DIAGNOSIS — I10 ESSENTIAL HYPERTENSION: ICD-10-CM

## 2020-01-27 RX ORDER — METOPROLOL SUCCINATE 25 MG/1
TABLET, EXTENDED RELEASE ORAL
Qty: 90 TABLET | Refills: 3 | Status: SHIPPED | OUTPATIENT
Start: 2020-01-27 | End: 2020-12-04

## 2020-01-27 NOTE — TELEPHONE ENCOUNTER
Last OV 07/12/19. No future appt scheduled.      Hypertension Medications Protocol Failed1/26 9:39 AM   Appointment in past 6 or next 3 months

## 2020-01-28 ENCOUNTER — TELEPHONE (OUTPATIENT)
Dept: INTERNAL MEDICINE CLINIC | Facility: CLINIC | Age: 60
End: 2020-01-28

## 2020-01-28 NOTE — TELEPHONE ENCOUNTER
Patient calling to see if Dr. Torrie Hamilton received disability paperwork from the Social Security office for her to complete?

## 2020-01-29 NOTE — TELEPHONE ENCOUNTER
Pt is calling to follow up on her paper work request. Please at least let her know if paperwork was received. Pt states that she needs to call social security today concerning this matter.  Social security is saying they sent paperwork approximately 2 weeks

## 2020-01-29 NOTE — TELEPHONE ENCOUNTER
Call Pt back to inform her that Dr Shannon Camilo said she hasn't received anything yet. Confirmed our fax number with Pt who states that she will let social security to resend the paperwork.

## 2020-08-12 ENCOUNTER — TELEPHONE (OUTPATIENT)
Dept: INTERNAL MEDICINE CLINIC | Facility: CLINIC | Age: 60
End: 2020-08-12

## 2020-08-12 NOTE — TELEPHONE ENCOUNTER
requst from [employer] medical records.    Macario Castro    S:  [id number] [phone]    Request has been prepared and sent to scan stat

## 2020-11-18 ENCOUNTER — TELEPHONE (OUTPATIENT)
Dept: INTERNAL MEDICINE CLINIC | Facility: CLINIC | Age: 60
End: 2020-11-18

## 2020-11-18 NOTE — TELEPHONE ENCOUNTER
Patient received a letter from the hospital that it's time for her mammogram.  She's asking if Dr. Audie Dakin could please put an order in for her, so she can schedule an appointment.

## 2020-12-04 DIAGNOSIS — I10 ESSENTIAL HYPERTENSION: ICD-10-CM

## 2020-12-04 RX ORDER — METOPROLOL SUCCINATE 25 MG/1
25 TABLET, EXTENDED RELEASE ORAL DAILY
Qty: 90 TABLET | Refills: 0 | Status: SHIPPED | OUTPATIENT
Start: 2020-12-04 | End: 2021-04-12

## 2020-12-04 NOTE — TELEPHONE ENCOUNTER
Patient called for a refill on Metoprolol Succinate ER 25 MG Oral Tablet 24 HR medication. Also, asking for a return call from Dr. Guillermo Kramer MA if the doctor will be sending in the mail her mammogram order.

## 2020-12-08 ENCOUNTER — TELEPHONE (OUTPATIENT)
Dept: INTERNAL MEDICINE CLINIC | Facility: CLINIC | Age: 60
End: 2020-12-08

## 2020-12-08 NOTE — TELEPHONE ENCOUNTER
Pt says she slipped and fell and knocked out 3 of her teeth.  Her insurance says that they will cover it if Dr Virgen Kaur will give her a referral for a dentist that takes her insurance, they told her because it was an \"injury\" they'd cover it only if Dr Chandler Drafts

## 2020-12-09 NOTE — TELEPHONE ENCOUNTER
Informed patient that we do not know which doctors take an HMO. She needs to call her insurance and get  Few names from them.   Once she has a name we can submit the formal referral request.

## 2020-12-09 NOTE — TELEPHONE ENCOUNTER
Patient scheduled her annual physical on 1/2/2021. Asking for a referral for an [facility] oral surgeon. Knocked out 3 teeth when she fell.

## 2021-01-19 ENCOUNTER — TELEPHONE (OUTPATIENT)
Dept: INTERNAL MEDICINE CLINIC | Facility: CLINIC | Age: 61
End: 2021-01-19

## 2021-01-19 NOTE — TELEPHONE ENCOUNTER
Received fax from:  [id number]  [facility], [address]  [phone]  Fax [phone]  Send to [facility]

## 2021-02-18 NOTE — TELEPHONE ENCOUNTER
HISTORY OF PRESENT ILLNESS:  This is a 52-year-old female with history of cervicalgia and low back pain.   She has a band-like pressure sensation, aggravated on sitting, standing and walking, alleviated by rest.  No numbness or tingling sensation.  No sphincter compromises.  She has an MRI of the cervical spine performed on June 27, 2014, that shows at the level C5-C6, a left disc protrusion and previous fusion on the level C5, C6, and C7.  She had an MRI of the lumbar spine performed on February 17, 2014, that shows at level L3-4, a mild disc bulge and at L4-5, facet joint arthropathy and spondylosis. She underwent  bilateral lumbar facet joint and medial branches radiofrequency neurolysis more than 6 months ago with excellent relief until last month.    Allergies : NKDA    Patient History: Home Medications  escitalopram (escitalopram 5 mg oral tablet) 5 mg = 1 tab(s), PO, Tab, qDay, # 90 tab(s)  08/29/2017 12:55  tiZANidine 4 mg =, PO, q8hr  08/29/2017 12:55  acetaminophen-hydrocodone (Norco 325 mg-7.5 mg oral tablet) 1 tab, PO, q8hr, 0 Refill(s)  08/29/2017 12:55  cholecalciferol (Vitamin D3 2000 intl units oral capsule) 2,000 Int. Units = 1 cap(s), PO, Cap, qDay, # 75 cap(s)  08/29/2017 12:55  cyanocobalamin (Vitamin B-12 1000 mcg/mL injection) 1,000 mcg = 1 mL, IM, Soln, qMonth  08/29/2017 12:55  fluticasone nasal (Flonase 0.05 mg/inh nasal spray) 1 spray(s), Nasal, qDay, # 16 g, Spray  08/29/2017 12:55  other medication See Instructions, allergy shots, Instructions Replace Required Details  08/29/2017 12:55  sodium chloride nasal (Simply Saline 0.9% nasal spray) 1 spray(s), Nasal, q30min, PRN for dry nasal passages, # 45 mL, Spray  08/29/2017 12:55  celecoxib 200 mg =, PO, BID  08/29/2017 12:55  cetirizine (ZyrTEC) 10 mg =, PO, qDay  08/29/2017 12:55  pantoprazole 40 mg =, PO, BID  08/29/2017 12:55    Allergies (2) Active Reaction  chlorhexidine topical None Documented  Latex None  Pt's  verified  Spoke with Pt she informed me she refused to see other partners that were offered by staff- also that she could self refer to Derm per Dr. Sebastian Poole- Pt states she discovered a lump on her breast and is very worried as she has been a Pt of Documented        PAST MEDICAL HISTORY:  Depression.    PAST SURGICAL HISTORY:  Laparoscopic surgery for endometriosis 2008 and 2013.  Foot surgery on the right for plantar fasciitis in 2010.  Foot surgery on the left in 2011 for plantar fasciitis and vascular surgery in 2011.  Cervical fusion in 2013, hysterectomy for adenomyosis in 2013.    FAMILY HISTORY:  ALS and prostate cancer.    SOCIAL HISTORY:  She is .  She does not have children.  She denies drinking, smoking, or use of illicit drugs.  She used to smoke in the past but quit 10 years ago.    REVIEW OF SYSTEMS:  She denies fever, nausea, vomiting.  She has history of fatigue, sinusitis, hoarseness, heartburn, nausea, vomiting, diarrhea, constipation, GERD, arthritis, muscle cramps, tenderness, carpal tunnel syndrome, headaches, numbness, anxiety, depression, fibromyalgia, anemia .  She refers this is not a clinical diagnosis.  She denies any signs and symptoms from the eyes, ears, cardiovascular, genitourinary, skin systems.    PHYSICAL EXAMINATION:    GENERAL:  This is a well-nourished, alert, oriented x3 female, in no acute distress at the moment of exam.    VITAL SIGNS: T: 97.7    ,HR: 97   ,BP: 145/88   ,RR: 16   ,Pso2: 100%    .  Her height is 5 feet 4 inches and her weight is 85 kgs.  BMI: 32.    HEENT:  Conjunctivae are normal.  No icterus.  No nystagmus.    NECK:  Normal inspection, palpation, auscultation.    HEART:  Normal rhythms, normal heart sounds.    LUNGS:  Good air entry bilaterally.    ABDOMEN:  Soft.  No rebound tenderness.    NEUROLOGIC:  Cranial nerves II through XII are grossly intact.  The strength of the upper and lower extremities is normal.  Sensation is intact.  Deep tendon reflexes are normal.    MUSCULOSKELETAL:  Range of motion of the cervical and lumbar spine is limited due to pain.  The axial loading of the cervical spine is negative.  There is pain on palpation of bilateral trapezius muscles, bilateral levator muscle  on her neck.  In the lower extremities, straight raising test is negative.  There is no pain over the spinous process.  There is pain on palpation of bilateral lumbar facet joints with reproduction of the patient's pain.  Pete maneuver is negative.    EXTREMITIES:  Peripheral pulses are present.  There is no edema.    ASSESSMENT AND PLAN:  This is a 52-year-old female with history of cervicalgia and low back pain for several years.  She has a fusion in 2013 with mainly discomfort, bilateral shoulder area.  She underwent physical therapy.   Also, her main pain at this time is in the low back, band-like pressure sensation. She underwent  bilateral lumbar facet joint and medial branches radiofrequency neurolysis more than 6 months ago with excellent relief until last month. I can reproduce on palpation of bilateral lumbar facet joints.  She has an MRI of the lumbar spine that shows mild spondylosis, for which I will schedule the patient to have a   bilateral lumbar facet joint and medial branches radiofrequency neurolysis one side at a time  under fluoroscopic guidance and IV sedation.  I went over the procedure with the patient, potential benefits, complications including bleeding, infection, nerve injury, and worsening of her pain as well as alternative options.  I went over the procedure and complications in very simple terms.  The patient understood, agrees, and wants to proceed. Today I will give her a prescription for norco 10/325 1 tab po q8 prn for pain total of 90 pills no refills, to do not feel until September 24 of 2017.  Today a urine toxicology at point of service is concordant  with medications will wait for lab confirmatory results.    Facet Joint Median branches  Radiofrequency Neurotomy CPT codes : 42842,11122,05627.  The patient has more than three months of moderate to severe pain with functional impairment.  The patient has inadequate response to conservative treatment as pain killers including  opioid medication ,  non steroidal antiinflamatory  and  tylenol.  The patient has inadequate response to aquatic therapy   for  more than 6 weeks.  The pain is predominantly axial and is not associated with radiculopathy or neurogenic claudication at this time.  There is no non-facet pathology that could explain the source of the patient's pain at this time as is reproducible at palpation of the lumbar facet joints.  During the physical exam the pain was reproduced at palpation of the lumbar facet joints.  The patient has an MRI of the lumbar spine that shows facet joint arthropathy, spondylosis.   PQRS :  OA lumbar spine  functioning assesed patient able to ambulate without assisting devices.  Pain assesed and documented in the chart, visual analog pain score is 6/10.  Current medications in then chart.  Radiology exposure is documented in operative reports  Quality of life with primary headache disorder , the patient does not have a primary headache disorder.  Falls plan of care METS >4. Sitting test performed.  Risk assesment for falls completed.  Osteoporosis treatment is being assesed and treated by primary doctor.  Tobacco prevention performed patient is a non smoker.  Screening blood pressure is normal  . The patient will follow with  primary doctor  BMI normal  , no follow up needed with primary doctor .   Advance care plan is  .      Thank very much, Dr. Rae.    ________________________________________  CHANO HANCOCK MD        CC: Luis Rae DO

## 2021-03-03 RX ORDER — BUPROPION HYDROCHLORIDE 150 MG/1
TABLET, EXTENDED RELEASE ORAL
Qty: 60 TABLET | Refills: 2 | Status: SHIPPED | OUTPATIENT
Start: 2021-03-03

## 2021-03-29 ENCOUNTER — OFFICE VISIT (OUTPATIENT)
Dept: INTERNAL MEDICINE CLINIC | Facility: CLINIC | Age: 61
End: 2021-03-29

## 2021-03-29 VITALS
SYSTOLIC BLOOD PRESSURE: 120 MMHG | OXYGEN SATURATION: 98 % | HEART RATE: 85 BPM | DIASTOLIC BLOOD PRESSURE: 80 MMHG | WEIGHT: 100 LBS | HEIGHT: 60 IN | BODY MASS INDEX: 19.63 KG/M2

## 2021-03-29 DIAGNOSIS — Z87.898 HISTORY OF UNSTEADY GAIT: ICD-10-CM

## 2021-03-29 DIAGNOSIS — Z00.00 WELLNESS EXAMINATION: Primary | ICD-10-CM

## 2021-03-29 DIAGNOSIS — Z12.31 SCREENING MAMMOGRAM, ENCOUNTER FOR: ICD-10-CM

## 2021-03-29 DIAGNOSIS — M81.0 AGE-RELATED OSTEOPOROSIS WITHOUT CURRENT PATHOLOGICAL FRACTURE: ICD-10-CM

## 2021-03-29 DIAGNOSIS — I50.30 DIASTOLIC CONGESTIVE HEART FAILURE, UNSPECIFIED HF CHRONICITY (HCC): ICD-10-CM

## 2021-03-29 PROCEDURE — G0438 PPPS, INITIAL VISIT: HCPCS | Performed by: INTERNAL MEDICINE

## 2021-03-29 PROCEDURE — 99396 PREV VISIT EST AGE 40-64: CPT | Performed by: INTERNAL MEDICINE

## 2021-03-29 PROCEDURE — 3074F SYST BP LT 130 MM HG: CPT | Performed by: INTERNAL MEDICINE

## 2021-03-29 PROCEDURE — 3008F BODY MASS INDEX DOCD: CPT | Performed by: INTERNAL MEDICINE

## 2021-03-29 PROCEDURE — 3079F DIAST BP 80-89 MM HG: CPT | Performed by: INTERNAL MEDICINE

## 2021-03-29 NOTE — PROGRESS NOTES
HPI/Subjective:   Patient ID: Brenton Hughes is a 63year old female. HPIPt here for a physical and fu on htn, hx of alcohol abuse, and osteoporosis and anxiety. Currently is trying not to drink .     History/Other:   Review of Systems   Constitutiona Cervical back: Neck supple. Neurological:      Mental Status: She is alert. Gait: Gait abnormal.   Psychiatric:         Thought Content:  Thought content normal.         Assessment & Plan:   Wellness examination  (primary encounter diagnosis) check

## 2021-04-11 DIAGNOSIS — I10 ESSENTIAL HYPERTENSION: ICD-10-CM

## 2021-04-12 RX ORDER — METOPROLOL SUCCINATE 25 MG/1
TABLET, EXTENDED RELEASE ORAL
Qty: 90 TABLET | Refills: 0 | Status: SHIPPED | OUTPATIENT
Start: 2021-04-12 | End: 2021-06-23

## 2021-06-18 ENCOUNTER — TELEPHONE (OUTPATIENT)
Dept: INTERNAL MEDICINE CLINIC | Facility: CLINIC | Age: 61
End: 2021-06-18

## 2021-06-18 DIAGNOSIS — S72.001A HIP FRACTURE, RIGHT, CLOSED, INITIAL ENCOUNTER (HCC): Primary | ICD-10-CM

## 2021-06-18 NOTE — TELEPHONE ENCOUNTER
Patient's injury due to fall occurred on May 30. Did not know she fractured her hip and just hoped the pain would go away. It didn't, so she went to [facility] which is close to her home.  Uncovered fractured hip and possible DVT in LE (swelling in ankle an

## 2021-06-18 NOTE — TELEPHONE ENCOUNTER
Patient called -    Last Thursday patient was in the ER at [facility] due to a fall. Has a fractured hip. She's been waiting for [facility] to have an ultrasound done per the ER doctor, but no one has called her back to schedule the ultrasound.  ER doctor

## 2021-06-23 DIAGNOSIS — I10 ESSENTIAL HYPERTENSION: ICD-10-CM

## 2021-06-23 RX ORDER — METOPROLOL SUCCINATE 25 MG/1
TABLET, EXTENDED RELEASE ORAL
Qty: 90 TABLET | Refills: 0 | Status: SHIPPED | OUTPATIENT
Start: 2021-06-23 | End: 2021-09-20

## 2021-07-07 ENCOUNTER — HOSPITAL ENCOUNTER (OUTPATIENT)
Dept: GENERAL RADIOLOGY | Facility: HOSPITAL | Age: 61
Discharge: HOME | End: 2021-07-07
Attending: ORTHOPAEDIC SURGERY
Payer: COMMERCIAL

## 2021-07-07 ENCOUNTER — OFFICE VISIT (OUTPATIENT)
Dept: ORTHOPEDICS CLINIC | Facility: CLINIC | Age: 61
End: 2021-07-07

## 2021-07-07 VITALS — HEIGHT: 61 IN | WEIGHT: 100 LBS | BODY MASS INDEX: 18.88 KG/M2

## 2021-07-07 DIAGNOSIS — M25.559 HIP PAIN: ICD-10-CM

## 2021-07-07 DIAGNOSIS — M25.559 HIP PAIN: Primary | ICD-10-CM

## 2021-07-07 DIAGNOSIS — S72.111A CLOSED DISPLACED FRACTURE OF GREATER TROCHANTER OF RIGHT FEMUR, INITIAL ENCOUNTER (HCC): ICD-10-CM

## 2021-07-07 PROCEDURE — 27232 TREAT THIGH FRACTURE: CPT | Performed by: ORTHOPAEDIC SURGERY

## 2021-07-07 PROCEDURE — 3008F BODY MASS INDEX DOCD: CPT | Performed by: ORTHOPAEDIC SURGERY

## 2021-07-07 PROCEDURE — 73502 X-RAY EXAM HIP UNI 2-3 VIEWS: CPT | Performed by: ORTHOPAEDIC SURGERY

## 2021-07-07 PROCEDURE — 99243 OFF/OP CNSLTJ NEW/EST LOW 30: CPT | Performed by: ORTHOPAEDIC SURGERY

## 2021-07-07 NOTE — PROGRESS NOTES
NURSING INTAKE COMMENTS: Patient presents with:  Hip Pain: right hip fx s/p fall x 1 month, pain with activity, ambulating with walker      HPI: This 63year old female presents today with complaints of right hip pain.   She fell a month ago and fractured t status: Current Every Day Smoker        Packs/day: 1.00        Years: 40.00        Pack years: 40        Types: Cigarettes      Smokeless tobacco: Never Used    Substance and Sexual Activity      Alcohol use:  Yes        Alcohol/week: 2.0 standard drinks WO PELVIS 2 OR 3 VIEWS, RIGHT (CPT=73502); Future    Closed displaced fracture of greater trochanter of right femur, initial encounter ([facility] [address]        Assessment: Fracture of the right greater trochanter with slight distraction. No evidence of callus yet.

## 2021-07-08 ENCOUNTER — TELEPHONE (OUTPATIENT)
Dept: INTERNAL MEDICINE CLINIC | Facility: CLINIC | Age: 61
End: 2021-07-08

## 2021-07-08 NOTE — TELEPHONE ENCOUNTER
Patient called needing something for pain for her hip that you broke. Saw the orthopedic surgeon yesterday. Takes 4 motrin in a row and doesn't want to do that. Only takes some of the pain away.

## 2021-08-09 ENCOUNTER — OFFICE VISIT (OUTPATIENT)
Dept: ORTHOPEDICS CLINIC | Facility: CLINIC | Age: 61
End: 2021-08-09

## 2021-08-09 ENCOUNTER — HOSPITAL ENCOUNTER (OUTPATIENT)
Dept: GENERAL RADIOLOGY | Facility: HOSPITAL | Age: 61
Discharge: HOME | End: 2021-08-09
Attending: ORTHOPAEDIC SURGERY
Payer: COMMERCIAL

## 2021-08-09 ENCOUNTER — TELEPHONE (OUTPATIENT)
Dept: INTERNAL MEDICINE CLINIC | Facility: CLINIC | Age: 61
End: 2021-08-09

## 2021-08-09 VITALS — HEIGHT: 60 IN | WEIGHT: 105 LBS | BODY MASS INDEX: 20.62 KG/M2

## 2021-08-09 DIAGNOSIS — Z47.89 ORTHOPEDIC AFTERCARE: ICD-10-CM

## 2021-08-09 DIAGNOSIS — M80.00XD AGE-RELATED OSTEOPOROSIS WITH CURRENT PATHOLOGICAL FRACTURE WITH ROUTINE HEALING, SUBSEQUENT ENCOUNTER: ICD-10-CM

## 2021-08-09 DIAGNOSIS — Z47.89 ORTHOPEDIC AFTERCARE: Primary | ICD-10-CM

## 2021-08-09 PROCEDURE — 73502 X-RAY EXAM HIP UNI 2-3 VIEWS: CPT | Performed by: ORTHOPAEDIC SURGERY

## 2021-08-09 PROCEDURE — 3008F BODY MASS INDEX DOCD: CPT | Performed by: ORTHOPAEDIC SURGERY

## 2021-08-09 PROCEDURE — 99024 POSTOP FOLLOW-UP VISIT: CPT | Performed by: ORTHOPAEDIC SURGERY

## 2021-08-09 NOTE — TELEPHONE ENCOUNTER
Patient saw Dr. Henry Cervantes today. He suggested she reach out to her PCP to have her send a medication to her pharmacy, [facility] for her osteoporosis.

## 2021-08-09 NOTE — PROGRESS NOTES
NURSING INTAKE COMMENTS: Patient presents with:  Hip Pain: follow up right hip, using walker less, some soreness      HPI: This 63year old female presents today for follow-up after her right her greater trochanter fracture.   She has been using a walker bu tobacco: Never Used    Substance and Sexual Activity      Alcohol use:  Yes        Alcohol/week: 2.0 standard drinks        Types: 2 Shots of liquor per week        Comment:  reports patient drinks 3-6 drinks daily; vodka and tonic or glasses of wine Future        Assessment: I recommend continued use of the walker for another month. Plan: Follow-up in 1 month with new radiographs. Anticipate transitioning to a cane beginning physical therapy for strengthening at that time.   I also referred her to

## 2021-09-20 ENCOUNTER — TELEPHONE (OUTPATIENT)
Dept: INTERNAL MEDICINE CLINIC | Facility: CLINIC | Age: 61
End: 2021-09-20

## 2021-09-20 DIAGNOSIS — I10 ESSENTIAL HYPERTENSION: ICD-10-CM

## 2021-09-20 RX ORDER — METOPROLOL SUCCINATE 25 MG/1
TABLET, EXTENDED RELEASE ORAL
Qty: 90 TABLET | Refills: 0 | Status: SHIPPED | OUTPATIENT
Start: 2021-09-20 | End: 2021-12-13

## 2021-09-20 NOTE — TELEPHONE ENCOUNTER
Requested Prescriptions     Pending Prescriptions Disp Refills   • METOPROLOL SUCCINATE 25 MG Oral Tablet 24 Hr [Pharmacy Med Name: METOPROLOL ER SUCCINATE 25MG TABS] 90 tablet 0     Sig: TAKE 1 TABLET BY MOUTH EVERY DAY     Last office visit: 3-29-21  Med

## 2021-10-05 ENCOUNTER — TELEPHONE (OUTPATIENT)
Dept: INTERNAL MEDICINE CLINIC | Facility: CLINIC | Age: 61
End: 2021-10-05

## 2021-10-05 NOTE — TELEPHONE ENCOUNTER
Patient outreach letter sent for the following      Pneumococcal Vaccine: Birth to 63yrs(1 of 2 - PPSV23) Never done  Colonoscopy Never done  FIT/FOBT Colorectal Screening Never done  Zoster Vaccines(1 of 2) Never done  Lung Cancer Screening Never done  To

## 2021-10-06 ENCOUNTER — OFFICE VISIT (OUTPATIENT)
Dept: ORTHOPEDICS CLINIC | Facility: CLINIC | Age: 61
End: 2021-10-06

## 2021-10-06 ENCOUNTER — HOSPITAL ENCOUNTER (OUTPATIENT)
Dept: GENERAL RADIOLOGY | Facility: HOSPITAL | Age: 61
Discharge: HOME | End: 2021-10-06
Attending: ORTHOPAEDIC SURGERY
Payer: COMMERCIAL

## 2021-10-06 VITALS — WEIGHT: 105 LBS | BODY MASS INDEX: 20.62 KG/M2 | HEIGHT: 60 IN

## 2021-10-06 DIAGNOSIS — Z47.89 ORTHOPEDIC AFTERCARE: Primary | ICD-10-CM

## 2021-10-06 DIAGNOSIS — Z47.89 ORTHOPEDIC AFTERCARE: ICD-10-CM

## 2021-10-06 DIAGNOSIS — S72.111D CLOSED DISPLACED FRACTURE OF GREATER TROCHANTER OF RIGHT FEMUR WITH ROUTINE HEALING, SUBSEQUENT ENCOUNTER: ICD-10-CM

## 2021-10-06 PROCEDURE — 99213 OFFICE O/P EST LOW 20 MIN: CPT | Performed by: ORTHOPAEDIC SURGERY

## 2021-10-06 PROCEDURE — 73502 X-RAY EXAM HIP UNI 2-3 VIEWS: CPT | Performed by: ORTHOPAEDIC SURGERY

## 2021-10-06 PROCEDURE — 3008F BODY MASS INDEX DOCD: CPT | Performed by: ORTHOPAEDIC SURGERY

## 2021-10-06 NOTE — PROGRESS NOTES
NURSING INTAKE COMMENTS: Patient presents with: Follow - Up: R hip fracture, has not started PT, 3/10 pain scale      HPI: This 63year old female presents today for follow-up of her right hip greater trochanter fracture. She denies pain.   She still uses Years: 40.00        Pack years: 40        Types: Cigarettes      Smokeless tobacco: Never Used    Substance and Sexual Activity      Alcohol use:  Yes        Alcohol/week: 2.0 standard drinks        Types: 2 Shots of liquor per week        Comment:  aftercare  -     XR HIP W OR WO PELVIS 2 OR 3 VIEWS, RIGHT (CPT=73502);  Future    Closed displaced fracture of greater trochanter of right femur with routine healing, subsequent encounter  -     PHYSICAL THERAPY EXTERNAL        Assessment: Healing greater

## 2021-10-07 ENCOUNTER — TELEPHONE (OUTPATIENT)
Dept: ORTHOPEDICS CLINIC | Facility: CLINIC | Age: 61
End: 2021-10-07

## 2021-10-07 NOTE — TELEPHONE ENCOUNTER
Patient has [facility] and [facility] is out of network. Patient can go to [facility], [facility], or [facility]. Please advise of location if going to an outside facility.

## 2021-10-13 NOTE — TELEPHONE ENCOUNTER
Called pt and informed her per [facility] message.  She states she will look into these in network locations and see what is close to home and CB to let us know so we can complete the referral.

## 2021-11-17 ENCOUNTER — TELEPHONE (OUTPATIENT)
Dept: INTERNAL MEDICINE CLINIC | Facility: CLINIC | Age: 61
End: 2021-11-17

## 2021-11-17 NOTE — TELEPHONE ENCOUNTER
Patient states she has chosen a place for PT and would like a referral. Please advise     [address] [place] [address] services in [facility] [address]   Phone: [phone]  Fax: [phone]

## 2021-11-17 NOTE — TELEPHONE ENCOUNTER
Spoke with patient. Found a therapy location close to home. Informed her that she should give the information to Dr. Scott Ballard so he can submit the referral and request for authorization.     If her insurance requires that it come from her PCP, Dr. Scott Ballard

## 2021-11-17 NOTE — TELEPHONE ENCOUNTER
LVM:   Patient states she has called several times requesting a referral for therapy after seeing Dr. Henry Cervantes. ~~  Note:  There are no messages in chart for Dr. Fatoumata Lagunas.

## 2021-11-23 NOTE — TELEPHONE ENCOUNTER
Managed care- please see message below. Patient has chose an outside facility. Name: [facility]  Age: 54 Ki Stover MD  Date of Service: [unfilled]     Deana Gómez ByManny Jean Baptiste M.D. Released By- Joy Jean Baptiste Zoltan Colunga DO  12/12/20 [id number]

## 2021-11-23 NOTE — TELEPHONE ENCOUNTER
Outside facility is not in network. Patient can go to [facility], AT, THE [facility], or [facility].

## 2021-11-24 NOTE — TELEPHONE ENCOUNTER
S/w pt and informed her per message. She would like referral to be made for-  [facility] in [address].  [address] [place] [phone]  Fax [phone]

## 2021-12-13 ENCOUNTER — TELEPHONE (OUTPATIENT)
Dept: INTERNAL MEDICINE CLINIC | Facility: CLINIC | Age: 61
End: 2021-12-13

## 2021-12-13 DIAGNOSIS — I10 ESSENTIAL HYPERTENSION: ICD-10-CM

## 2021-12-13 RX ORDER — METOPROLOL SUCCINATE 25 MG/1
TABLET, EXTENDED RELEASE ORAL
Qty: 90 TABLET | Refills: 0 | Status: SHIPPED | OUTPATIENT
Start: 2021-12-13

## 2021-12-13 RX ORDER — METOPROLOL SUCCINATE 25 MG/1
25 TABLET, EXTENDED RELEASE ORAL DAILY
Qty: 90 TABLET | Refills: 0 | OUTPATIENT
Start: 2021-12-13

## 2021-12-13 NOTE — TELEPHONE ENCOUNTER
Patient calling for a refill on Metoprolol Succinate 25 MG Oral Tablet 24 HR. Asking for a 90 day supply.

## 2021-12-15 ENCOUNTER — TELEPHONE (OUTPATIENT)
Dept: INTERNAL MEDICINE CLINIC | Facility: CLINIC | Age: 61
End: 2021-12-15

## 2021-12-15 NOTE — TELEPHONE ENCOUNTER
Patient informed:    RN checked status:    [de-identified]. has appt today--pending since 11/19.   Sent message to referral team.    Dieudonne Baugh is Thursday at [facility].

## 2021-12-15 NOTE — TELEPHONE ENCOUNTER
Patient saw Dr. Jerome Thorpe and she was sent for one visit to [facility]. Patient verifying that she has additional visits for physical therapy at [facility]. Asking if she can receive a call back.  As she has an appointment tomorrow and she can't afford to pay out of

## 2022-01-07 ENCOUNTER — TELEPHONE (OUTPATIENT)
Dept: INTERNAL MEDICINE CLINIC | Facility: CLINIC | Age: 62
End: 2022-01-07

## 2022-01-07 NOTE — TELEPHONE ENCOUNTER
Jaylyn So last night and dionna to [employer]. Is now in splint. Severe break by the shoulder.     [facility] [employer] HMO:  Galen Hammans [id number]  Grp: [id number]  Effect: since 2021    Dr. Shawn Corey

## 2022-01-07 NOTE — TELEPHONE ENCOUNTER
New referral submitted and approved for Dr. Donald Kunz who is in-network. Patient aware and was given the phone number.

## 2022-01-07 NOTE — TELEPHONE ENCOUNTER
Pt would like her doctor to give her a call because she had a fall and broke her L arm, and now needs an order for this.

## 2022-01-07 NOTE — TELEPHONE ENCOUNTER
LVM:  Please call us back. When was the fall and where was she treated? Is she looking for an orthopaedic referral?  If so, she has seen Dr. Nancy Dunne in the past, does she want to see him again?

## 2022-01-10 ENCOUNTER — TELEPHONE (OUTPATIENT)
Dept: ORTHOPEDICS CLINIC | Facility: CLINIC | Age: 62
End: 2022-01-10

## 2022-01-10 NOTE — TELEPHONE ENCOUNTER
Pt called stating pt went to the [address] [place] [address] emergency room on 1-6-22 for fracture left arm. Pt requesting a sooner appointment.   Please call

## 2022-01-11 RX ORDER — HYDROCODONE BITARTRATE AND ACETAMINOPHEN 5; 325 MG/1; MG/1
1 TABLET ORAL EVERY 6 HOURS PRN
COMMUNITY
Start: 2022-01-07

## 2022-01-11 NOTE — TELEPHONE ENCOUNTER
Called pt and she states she fell on 1/6/22 and went to [facility] and was told left humerus fx and put in temp splint and sling. She was advised she may need sx. Pt rates her pain 6/10 and is taking ibuprofen 800mg every 8 hours.  appt made on 1/12

## 2022-01-11 NOTE — TELEPHONE ENCOUNTER
Per pt returning a call, states her phone is not ringing when the nurse calls.  Per pt will try back after 1pm. Thank you

## 2022-01-11 NOTE — TELEPHONE ENCOUNTER
Needs all records from [facility] to be immediately faxed to Dr. Ragini Brantley office for her appointment on Wednesday.

## 2022-01-12 ENCOUNTER — HOSPITAL ENCOUNTER (OUTPATIENT)
Dept: GENERAL RADIOLOGY | Facility: HOSPITAL | Age: 62
Discharge: HOME | End: 2022-01-12
Attending: ORTHOPAEDIC SURGERY
Payer: COMMERCIAL

## 2022-01-12 ENCOUNTER — OFFICE VISIT (OUTPATIENT)
Dept: ORTHOPEDICS CLINIC | Facility: CLINIC | Age: 62
End: 2022-01-12
Payer: COMMERCIAL

## 2022-01-12 DIAGNOSIS — M25.512 LEFT SHOULDER PAIN, UNSPECIFIED CHRONICITY: ICD-10-CM

## 2022-01-12 DIAGNOSIS — S42.202A CLOSED FRACTURE OF PROXIMAL END OF LEFT HUMERUS, UNSPECIFIED FRACTURE MORPHOLOGY, INITIAL ENCOUNTER: Primary | ICD-10-CM

## 2022-01-12 PROCEDURE — 23600 CLTX PROX HUMRL FX W/O MNPJ: CPT | Performed by: ORTHOPAEDIC SURGERY

## 2022-01-12 PROCEDURE — 99244 OFF/OP CNSLTJ NEW/EST MOD 40: CPT | Performed by: ORTHOPAEDIC SURGERY

## 2022-01-12 PROCEDURE — 73060 X-RAY EXAM OF HUMERUS: CPT | Performed by: ORTHOPAEDIC SURGERY

## 2022-01-12 NOTE — PROGRESS NOTES
NURSING INTAKE COMMENTS: Patient presents with:  Consult: ER f/u on Left Arm Injury. Lalo Challenger inside the house 1/6/2022, Patient pain scale 5./10      HPI: This 57year old female presents today with complaints of left upper extremity pain after an injury.   Sh Heart Disorder Maternal Grandfather    • Stroke Maternal Grandmother    • Stroke Paternal Grandmother    • Diabetes Maternal Uncle    • Hypertension Maternal Uncle    • Hypertension Maternal Aunt        Social History    Occupational History      Not on fi hydrated, alert and responsive, no acute distress noted  Extremities: Left shoulder mildly tender over the proximal numerous. No tenderness over the acromion or AC joint or distal clavicle. Alignment of the extremity unremarkable. Skin intact.   Neurolog

## 2022-01-12 NOTE — TELEPHONE ENCOUNTER
LVM:  Per Dr. Eufemia Raman: as of last evening, she has not received anything from [facility].

## 2022-01-14 ENCOUNTER — TELEPHONE (OUTPATIENT)
Dept: INTERNAL MEDICINE CLINIC | Facility: CLINIC | Age: 62
End: 2022-01-14

## 2022-01-19 ENCOUNTER — OFFICE VISIT (OUTPATIENT)
Dept: ORTHOPEDICS CLINIC | Facility: CLINIC | Age: 62
End: 2022-01-19
Payer: COMMERCIAL

## 2022-01-19 ENCOUNTER — HOSPITAL ENCOUNTER (OUTPATIENT)
Dept: GENERAL RADIOLOGY | Facility: HOSPITAL | Age: 62
Discharge: HOME | End: 2022-01-19
Attending: ORTHOPAEDIC SURGERY
Payer: COMMERCIAL

## 2022-01-19 DIAGNOSIS — Z47.89 ORTHOPEDIC AFTERCARE: ICD-10-CM

## 2022-01-19 DIAGNOSIS — S42.202P CLOSED FRACTURE OF PROXIMAL END OF LEFT HUMERUS WITH MALUNION, UNSPECIFIED FRACTURE MORPHOLOGY, SUBSEQUENT ENCOUNTER: Primary | ICD-10-CM

## 2022-01-19 PROCEDURE — 73060 X-RAY EXAM OF HUMERUS: CPT | Performed by: ORTHOPAEDIC SURGERY

## 2022-01-19 PROCEDURE — 99024 POSTOP FOLLOW-UP VISIT: CPT | Performed by: ORTHOPAEDIC SURGERY

## 2022-01-19 NOTE — PROGRESS NOTES
NURSING INTAKE COMMENTS: Patient presents with: Follow - Up: 1 week f/u of Left Humerus. Patient pain scale 8/10      HPI: This 57year old female presents today with complaints of left upper arm injury follow-up.   She is now 2 weeks postinjury from a pr Maternal Grandmother    • Stroke Paternal Grandmother    • Diabetes Maternal Uncle    • Hypertension Maternal Uncle    • Hypertension Maternal Aunt        Social History    Occupational History      Not on file    Tobacco Use      Smoking status: Current E noted  Extremities: Left upper arm compartments soft nontender. Mild tenderness over the proximal humerus. Distal clavicle AC joint nontender.   Neurological: [Left] hand light touch and pinprick sensory exam normal. Lateral shoulder light touch and pinpr GFRNAA 96 07/12/2019    GFRAA 111 07/12/2019        Assessment and Plan:  Diagnoses and all orders for this visit:    Closed fracture of proximal end of left humerus with malunion, unspecified fracture morphology, subsequent encounter    Orthopedic afterca

## 2022-01-27 ENCOUNTER — TELEPHONE (OUTPATIENT)
Dept: ORTHOPEDICS CLINIC | Facility: CLINIC | Age: 62
End: 2022-01-27

## 2022-01-27 NOTE — TELEPHONE ENCOUNTER
Patient was notified with understanding. Report to ER for cold hand, color change, numbness or tingling or increasing pain. She verbalized understanding and compliance.

## 2022-01-27 NOTE — TELEPHONE ENCOUNTER
Swelling in fingers will be normal. Continue to monitor symptoms. If worsens, go to ER for evaluation. Wiggle fingers, use ice on arm and hand.

## 2022-01-27 NOTE — TELEPHONE ENCOUNTER
I spoke with patient. She reports new swelling in her hand and fingers this morning. Twice their normal size. Denies numbness or tingling. Skin is pink, warm and dry. Denies new pain. Denies chest pain or shortness of breath.   Compliant with marisel a

## 2022-01-27 NOTE — TELEPHONE ENCOUNTER
Pt called stating pt is being treated for a fractured left arm. Pt's left hand and fingers are swollen. Can you rx.    Please call pt

## 2025-01-09 NOTE — ADDENDUM NOTE
Addended by: Ambrocio Kim on: 3/29/2021 05:46 PM     Modules accepted: Orders
suspected pharyngeal dysphagia and high risk of aspiration
suspected pharyngeal dysphagia secondary to limited volitional swallows and wet airway sounds
not appropriate for PO due to mental status
+Overt s/s aspiration baseline
suspected pharyngeal dysphagia and high risk of aspiration
toleration of puree and mildly thick liquids.
+Overt s/s aspiration for minced + moist, thin liquids and mildly thick liquids.

## (undated) DEVICE — CHLORAPREP 26ML APPLICATOR

## (undated) DEVICE — SOL  .9 1000ML BTL

## (undated) DEVICE — DRAPE SRG 70X60IN SPLT U IMPRV

## (undated) DEVICE — SUTURE VICRYL 2-0 FS-1

## (undated) DEVICE — CLIPPER BLADE 3M

## (undated) DEVICE — SUTURE VICRYL 0 CP-1

## (undated) DEVICE — DRILL BIT ZIM 2.5 4806-110-25

## (undated) DEVICE — COVER SGL STRL LGHT HNDL BLU

## (undated) DEVICE — STERILE TETRA-FLEX CF, ELASTIC BANDAGE LATEX FREE 6IN X5.5 YD: Brand: TETRA-FLEX™CF

## (undated) DEVICE — NON-ADHERENT STRIPS,OIL EMULSION: Brand: CURITY

## (undated) DEVICE — DRAPE C-ARM UNIVERSAL

## (undated) DEVICE — ABDOMINAL PAD: Brand: CURITY

## (undated) DEVICE — GOWN SURG AERO BLUE PERF LG

## (undated) DEVICE — SUCTION CANISTER, 3000CC,SAFELINER: Brand: DEROYAL

## (undated) DEVICE — DRILL BIT ZIM 2.7 2360-205-27

## (undated) DEVICE — SPLINT ORTH 30X4IN 1 LYR CMFT

## (undated) DEVICE — BATTERY

## (undated) DEVICE — 3M™ STERI-DRAPE™ U-DRAPE 1015: Brand: STERI-DRAPE™

## (undated) DEVICE — PROXIMATE SKIN STAPLERS (35 WIDE) CONTAINS 35 STAINLESS STEEL STAPLES (FIXED HEAD): Brand: PROXIMATE

## (undated) DEVICE — INTENDED FOR TISSUE SEPARATION, AND OTHER PROCEDURES THAT REQUIRE A SHARP SURGICAL BLADE TO PUNCTURE OR CUT.: Brand: BARD-PARKER ® STAINLESS STEEL BLADES

## (undated) DEVICE — REM POLYHESIVE ADULT PATIENT RETURN ELECTRODE: Brand: VALLEYLAB

## (undated) DEVICE — DRILL BIT ZIM 2.0 2360-175-20

## (undated) DEVICE — LOWER EXTREMITY: Brand: MEDLINE INDUSTRIES, INC.

## (undated) DEVICE — WEBRIL COTTON UNDERCAST PADDING: Brand: WEBRIL

## (undated) NOTE — LETTER
10/5/2021              Alan Lofton        [address] [place] [address] [place] [id number]         Dear Sanya Kelly,    In addition to helping you feel better when you are sick, we are interested in helping with your routine preventative services.

## (undated) NOTE — LETTER
12/5/2017              Anita Cummings        [address] [place] [address]         ABOVE PATIENT WAS SEEN/EXAMINED TODAY AT [facility] OFFICE. MAY RETURN TO WORK TOMORROW WITHOUT RESTRICTIONS.       Sincerely,    Kimberly Munoz DO  [address]

## (undated) NOTE — MR AVS SNAPSHOT
[address] at [address] [id number] [phone]               Thank you for choosing us for your health care visit with Queta Kaminski MD.  We are glad to serve you and happy to provide you with [facility] Take 0.5 tablets (12.5 mg total) by mouth 2 (two) times daily. Commonly known as:   Toprol XL           * omeprazole 20 MG Cpdr   Take 1 capsule (20 mg total) by mouth every morning before breakfast.   What changed:  Another medication with the same name your Zip Code and Date of Birth to complete the sign-up process. If you do not sign up before the expiration date, you must request a new code.     Your unique [employer] Access Code: [email or web address]  Expires: 5/16/2017 10:11 AM    If you have questions, you can c

## (undated) NOTE — LETTER
05/23/18        Kary Posada  [address] [facility]      Dear Lillie Cade,    [phone] Merged with [facility] records indicate that you have outstanding lab work and or testing that was ordered for you and has not yet been completed:          Comp Metabolic Panel